# Patient Record
Sex: MALE | Race: WHITE | NOT HISPANIC OR LATINO | URBAN - METROPOLITAN AREA
[De-identification: names, ages, dates, MRNs, and addresses within clinical notes are randomized per-mention and may not be internally consistent; named-entity substitution may affect disease eponyms.]

---

## 2019-06-19 ENCOUNTER — OUTPATIENT (OUTPATIENT)
Dept: OUTPATIENT SERVICES | Facility: HOSPITAL | Age: 64
LOS: 1 days | End: 2019-06-19
Payer: COMMERCIAL

## 2019-06-19 ENCOUNTER — APPOINTMENT (OUTPATIENT)
Dept: UROLOGY | Facility: CLINIC | Age: 64
End: 2019-06-19
Payer: COMMERCIAL

## 2019-06-19 VITALS
SYSTOLIC BLOOD PRESSURE: 143 MMHG | HEART RATE: 66 BPM | RESPIRATION RATE: 17 BRPM | HEIGHT: 70 IN | DIASTOLIC BLOOD PRESSURE: 82 MMHG | TEMPERATURE: 98.1 F | WEIGHT: 210 LBS | BODY MASS INDEX: 30.06 KG/M2

## 2019-06-19 PROCEDURE — 99203 OFFICE O/P NEW LOW 30 MIN: CPT | Mod: 25

## 2019-06-19 PROCEDURE — 51701 INSERT BLADDER CATHETER: CPT

## 2019-06-19 NOTE — HISTORY OF PRESENT ILLNESS
[FreeTextEntry1] : 64 year old male w/ severe depression presents w/ BPH and obstructive voiding symptoms.\par He is on Amphetamine to stay awake at work.\par Hx of Urolift in Nov 2018 in addition to 100 units of Botox in January and then 200 units of Botox in April 2019.\par He c/o urgency, very weak stream, and incontinence. \par Nocturia x2-3\par Previously had UDS evaluation, but he does not remember what those results were.\par PVR today was 236 mL.\par We will teach him SIC today.\par UA and urine culture ordered\par PSA ordered today.\par RTO next week. Consider scheduling for UDS and/or cystoscopy at that stage.

## 2019-06-19 NOTE — REVIEW OF SYSTEMS
[Negative] : Heme/Lymph [Feeling Tired] : feeling tired [Palpitations] : palpitations [Abdominal Pain] : abdominal pain [Constipation] : constipation [Diarrhea] : diarrhea [Heartburn] : heartburn [see HPI] : see HPI [No erections] : no erections [Seen by urologist before (Name)  ___] : Preciously seen by a urologist: [unfilled] [Urine retention] : urine retention [Wake up at night to urinate  How many times?  ___] : wakes up to urinate [unfilled] times during the night [Bladder pressure] : experiences bladder pressure [Strain or push to urinate] : strain or push to urinate [Slow urine stream] : slow urine stream [Interrupted urine stream] : interrupted urine stream [Bladder fullness after urinating] : bladder fullness after urinating [Anxiety] : anxiety [Depression] : depression [Feelings Of Weakness] : feelings of weakness

## 2019-06-19 NOTE — ASSESSMENT
[FreeTextEntry1] : PVR today was 236 mL.\par We will teach him SIC today.\par UA and urine culture ordered\par PSA ordered today.\par RTO next week. Consider scheduling for UDS and/or cystoscopy at that stage.

## 2019-06-19 NOTE — ADDENDUM
[FreeTextEntry1] :  I, Shannan García, acted solely as a scribe for Dr. Emanuel Moise. The documentation recorded by the scribe accurately reflects the service I personally performed and the decision by me.\par

## 2019-06-21 LAB — BACTERIA UR CULT: NORMAL

## 2019-06-27 DIAGNOSIS — R33.9 RETENTION OF URINE, UNSPECIFIED: ICD-10-CM

## 2019-06-27 DIAGNOSIS — R35.0 FREQUENCY OF MICTURITION: ICD-10-CM

## 2019-06-27 DIAGNOSIS — N40.1 BENIGN PROSTATIC HYPERPLASIA WITH LOWER URINARY TRACT SYMPTOMS: ICD-10-CM

## 2019-07-05 ENCOUNTER — APPOINTMENT (OUTPATIENT)
Dept: UROLOGY | Facility: CLINIC | Age: 64
End: 2019-07-05
Payer: COMMERCIAL

## 2019-07-05 PROCEDURE — 99213 OFFICE O/P EST LOW 20 MIN: CPT

## 2019-07-05 NOTE — ASSESSMENT
[FreeTextEntry1] : He is on SIC about every 5 hours because he reports he does not have the urge. \par Advised pt to perform SIC routinely every 4 hours after he has finished voiding what he can into the toilet.\par Male urinal was given to pt to measure his residual urine.\par We will schedule him for UDS evaluation.

## 2019-07-05 NOTE — HISTORY OF PRESENT ILLNESS
[FreeTextEntry1] : 64 year old male on SIC presents for f/u for incontinence\par He is on Amphetamine to stay awake at work.\par Hx of Urolift in Nov 2018 in addition to 100 units of Botox in January and then 200 units of Botox in April 2019\par He still c/o some urge incontinence.\par He is on SIC about every 5 hours because he reports he does not have the urge. \par Advised pt to perform SIC routinely every 4 hours after he has finished voiding what he can into the toilet.\par Male urinal was given to pt to measure his residual urine.\par We will schedule him for UDS evaluation.

## 2019-07-17 ENCOUNTER — OUTPATIENT (OUTPATIENT)
Dept: OUTPATIENT SERVICES | Facility: HOSPITAL | Age: 64
LOS: 1 days | End: 2019-07-17
Payer: COMMERCIAL

## 2019-07-17 ENCOUNTER — APPOINTMENT (OUTPATIENT)
Dept: UROLOGY | Facility: CLINIC | Age: 64
End: 2019-07-17
Payer: COMMERCIAL

## 2019-07-17 VITALS — HEART RATE: 74 BPM | TEMPERATURE: 97.7 F | SYSTOLIC BLOOD PRESSURE: 133 MMHG | DIASTOLIC BLOOD PRESSURE: 87 MMHG

## 2019-07-17 DIAGNOSIS — R35.0 FREQUENCY OF MICTURITION: ICD-10-CM

## 2019-07-17 PROCEDURE — 51741 ELECTRO-UROFLOWMETRY FIRST: CPT | Mod: 26

## 2019-07-17 PROCEDURE — 51797 INTRAABDOMINAL PRESSURE TEST: CPT | Mod: 26

## 2019-07-17 PROCEDURE — 51784 ANAL/URINARY MUSCLE STUDY: CPT | Mod: 26

## 2019-07-17 PROCEDURE — 51741 ELECTRO-UROFLOWMETRY FIRST: CPT

## 2019-07-17 PROCEDURE — 51728 CYSTOMETROGRAM W/VP: CPT

## 2019-07-17 PROCEDURE — 51728 CYSTOMETROGRAM W/VP: CPT | Mod: 26

## 2019-07-17 PROCEDURE — 51797 INTRAABDOMINAL PRESSURE TEST: CPT

## 2019-07-17 PROCEDURE — 51784 ANAL/URINARY MUSCLE STUDY: CPT

## 2019-07-22 DIAGNOSIS — N40.1 BENIGN PROSTATIC HYPERPLASIA WITH LOWER URINARY TRACT SYMPTOMS: ICD-10-CM

## 2019-07-22 DIAGNOSIS — N32.81 OVERACTIVE BLADDER: ICD-10-CM

## 2019-07-22 DIAGNOSIS — N39.41 URGE INCONTINENCE: ICD-10-CM

## 2019-07-31 ENCOUNTER — APPOINTMENT (OUTPATIENT)
Dept: UROLOGY | Facility: CLINIC | Age: 64
End: 2019-07-31
Payer: COMMERCIAL

## 2019-07-31 ENCOUNTER — OUTPATIENT (OUTPATIENT)
Dept: OUTPATIENT SERVICES | Facility: HOSPITAL | Age: 64
LOS: 1 days | End: 2019-07-31
Payer: COMMERCIAL

## 2019-07-31 VITALS
SYSTOLIC BLOOD PRESSURE: 134 MMHG | TEMPERATURE: 98.5 F | RESPIRATION RATE: 16 BRPM | DIASTOLIC BLOOD PRESSURE: 83 MMHG | HEART RATE: 83 BPM

## 2019-07-31 DIAGNOSIS — R35.0 FREQUENCY OF MICTURITION: ICD-10-CM

## 2019-07-31 PROCEDURE — 52000 CYSTOURETHROSCOPY: CPT

## 2019-08-02 NOTE — ASSESSMENT
[FreeTextEntry1] : UDS evaluation today shows no evidence of any obstruction, but this pt has detrusor instability and severe urge incontinence. He can void with a peak flow up to 30 mL/s but he also has continuously high residual urine that varies between 50 and 275 mL.\par I am concerned that he may have a diverticulum that is the underlying problem and consequently, I would like to do a cystoscopy on him next week. \par Pt will perform self intermittent catheterization ever 4 hours, 14 [place] coude needed because of enlarged prostate.

## 2019-08-02 NOTE — HISTORY OF PRESENT ILLNESS
[FreeTextEntry1] : 64 year old male w/ severe depression on amphetamine presents for UDS today.\par Hx of Urolift in Nov 2018 in addition to 100 units of Botox in January and 200 units of Botox in April 2019.\par Still has complaints of urge incontinence.\par His leakage has improved since he started performing SIC routinely every 4 hours.\par UDS evaluation today shows no evidence of any obstruction, but this pt has detrusor instability and severe urge incontinence. He can void with a peak flow up to 30 mL/s but he also has continuously high residual urine that varies between 50 and 275 mL.\par I am concerned that he may have a diverticulum that is the underlying problem and consequently, I would like to do a cystoscopy on him next week.

## 2019-08-05 DIAGNOSIS — N32.81 OVERACTIVE BLADDER: ICD-10-CM

## 2019-11-13 ENCOUNTER — APPOINTMENT (OUTPATIENT)
Dept: UROLOGY | Facility: CLINIC | Age: 64
End: 2019-11-13
Payer: COMMERCIAL

## 2019-11-13 ENCOUNTER — OUTPATIENT (OUTPATIENT)
Dept: OUTPATIENT SERVICES | Facility: HOSPITAL | Age: 64
LOS: 1 days | End: 2019-11-13
Payer: COMMERCIAL

## 2019-11-13 VITALS
RESPIRATION RATE: 16 BRPM | BODY MASS INDEX: 30.06 KG/M2 | TEMPERATURE: 97.8 F | HEIGHT: 70 IN | WEIGHT: 210 LBS | HEART RATE: 80 BPM | SYSTOLIC BLOOD PRESSURE: 127 MMHG | DIASTOLIC BLOOD PRESSURE: 86 MMHG

## 2019-11-13 DIAGNOSIS — R35.0 FREQUENCY OF MICTURITION: ICD-10-CM

## 2019-11-13 PROCEDURE — 51728 CYSTOMETROGRAM W/VP: CPT | Mod: 26

## 2019-11-13 PROCEDURE — 51797 INTRAABDOMINAL PRESSURE TEST: CPT | Mod: 26

## 2019-11-13 PROCEDURE — 51741 ELECTRO-UROFLOWMETRY FIRST: CPT | Mod: 26

## 2019-11-13 PROCEDURE — 51798 US URINE CAPACITY MEASURE: CPT | Mod: 59

## 2019-11-13 PROCEDURE — 51797 INTRAABDOMINAL PRESSURE TEST: CPT

## 2019-11-13 PROCEDURE — 51784 ANAL/URINARY MUSCLE STUDY: CPT | Mod: 26

## 2019-11-13 PROCEDURE — 51728 CYSTOMETROGRAM W/VP: CPT

## 2019-11-13 PROCEDURE — 51784 ANAL/URINARY MUSCLE STUDY: CPT

## 2019-11-13 PROCEDURE — 51741 ELECTRO-UROFLOWMETRY FIRST: CPT

## 2019-11-13 NOTE — ADDENDUM
[FreeTextEntry1] :  I, Randi Cooper, acted solely as a scribe for Dr. Emanuel Moise. The documentation recorded by the scribe accurately reflects the service I personally performed and the decision by me.\par

## 2019-11-13 NOTE — HISTORY OF PRESENT ILLNESS
[FreeTextEntry1] : 64 year old male w/ severe depression on amphetamine presents for BPH\par Hx of Urolift in Nov 2018 in addition to 100 units of Botox in January and 200 units of Botox in April 2019.\par s/p cystoscopy on 07/31/19\par Pt was on self intermittent catheterization but developed a severe UTI that required hospitalization at [place]. He has stopped his injections since them\par Nocturia x1. Pt does not feel as if his urination has improved. He's still experiencing frequency and doesn't feel as if he's emptied his bladder. If he ignores the urge there is dribbling. \par Pt has been taking fluvoxamine for depression. He has been on Oxybutynin with no improvement. \par \par Urine culture from 06/19/19 revealed less than 10,000 normal Urogenital jaycob. \par Pt had UDS again today. He doesn't have an obstructive pattern at all. The detrusor pressure is 64 and he was having involuntary contractions. His PVR was 35mL and he had a peak flow of 7.6 m/s\par Pt has no obstructive features on urodynamic testing and therefore I can not recommend he has a transurethral resection of his prostate.\par I think this pt should have a consolation with Dr. An and see if he can come up with any alternate forms of therapy for him.

## 2019-11-13 NOTE — ASSESSMENT
[FreeTextEntry1] : Urine culture from 06/19/19 revealed less than 10,000 normal Urogenital jaycob. \par Pt had UDS again today. He doesn't have an obstructive pattern at all. The detrusor pressure is 64 and he was having involuntary contractions. His PVR was 35mL and he had a peak flow of 7.6 m/s\par Pt has no obstructive features on urodynamic testing and therefore I can not recommend he has a transurethral resection of his prostate.\par I think this pt should have a consolation with Dr. An and see if he can come up with any alternate forms of therapy for him.

## 2019-11-20 DIAGNOSIS — N31.8 OTHER NEUROMUSCULAR DYSFUNCTION OF BLADDER: ICD-10-CM

## 2019-11-25 ENCOUNTER — APPOINTMENT (OUTPATIENT)
Dept: UROLOGY | Facility: CLINIC | Age: 64
End: 2019-11-25

## 2020-01-02 ENCOUNTER — APPOINTMENT (OUTPATIENT)
Dept: UROLOGY | Facility: CLINIC | Age: 65
End: 2020-01-02
Payer: COMMERCIAL

## 2020-01-02 VITALS
HEART RATE: 105 BPM | TEMPERATURE: 97.6 F | DIASTOLIC BLOOD PRESSURE: 77 MMHG | WEIGHT: 210 LBS | BODY MASS INDEX: 30.06 KG/M2 | RESPIRATION RATE: 17 BRPM | HEIGHT: 70 IN | SYSTOLIC BLOOD PRESSURE: 150 MMHG

## 2020-01-02 DIAGNOSIS — R33.9 RETENTION OF URINE, UNSPECIFIED: ICD-10-CM

## 2020-01-02 PROCEDURE — 99214 OFFICE O/P EST MOD 30 MIN: CPT

## 2020-01-02 PROCEDURE — 51798 US URINE CAPACITY MEASURE: CPT

## 2020-01-02 RX ORDER — SULFAMETHOXAZOLE AND TRIMETHOPRIM 800; 160 MG/1; MG/1
800-160 TABLET ORAL
Qty: 28 | Refills: 0 | Status: COMPLETED | COMMUNITY
Start: 2019-12-18

## 2020-01-02 RX ORDER — BUPROPION HYDROCHLORIDE 300 MG/1
300 TABLET, EXTENDED RELEASE ORAL
Qty: 90 | Refills: 0 | Status: ACTIVE | COMMUNITY
Start: 2019-10-24

## 2020-01-02 RX ORDER — TRAZODONE HYDROCHLORIDE 100 MG/1
100 TABLET ORAL
Qty: 90 | Refills: 0 | Status: ACTIVE | COMMUNITY
Start: 2019-03-27

## 2020-01-02 RX ORDER — BUPROPION HYDROCHLORIDE 100 MG/1
100 TABLET, FILM COATED, EXTENDED RELEASE ORAL
Qty: 60 | Refills: 0 | Status: DISCONTINUED | COMMUNITY
Start: 2019-05-09 | End: 2020-01-02

## 2020-01-02 RX ORDER — DEXTROAMPHETAMINE SACCHARATE, AMPHETAMINE ASPARTATE MONOHYDRATE, DEXTROAMPHETAMINE SULFATE AND AMPHETAMINE SULFATE 2.5; 2.5; 2.5; 2.5 MG/1; MG/1; MG/1; MG/1
10 CAPSULE, EXTENDED RELEASE ORAL
Qty: 30 | Refills: 0 | Status: ACTIVE | COMMUNITY
Start: 2019-12-17

## 2020-01-02 RX ORDER — CLONAZEPAM 1 MG/1
1 TABLET ORAL
Qty: 90 | Refills: 0 | Status: ACTIVE | COMMUNITY
Start: 2019-12-17

## 2020-01-02 RX ORDER — FLUVOXAMINE MALEATE 100 MG/1
100 TABLET, FILM COATED ORAL
Qty: 270 | Refills: 0 | Status: ACTIVE | COMMUNITY
Start: 2019-05-09

## 2020-01-02 NOTE — HISTORY OF PRESENT ILLNESS
[FreeTextEntry1] : 64 year old gentleman with hx of BPH  referred by Dr Moise for refractory OAB- wet - UUI ,urinary urgency at times, frequency, small volume voids.Recent UTI treated with Bactrim for 14 days completed yesterday and no lab with pt. He is pending MRI of his LS fro hx of falls and is seeing Neurologist soon. PSH :Hx of Urolift in Nov 2018 ( [facility]) in addition to 100 units of Botox in January and 200 units of Botox in April 2019.He is s/p cystoscopy on 07/31/19-A flexible cystoscope was used to visualize the urethra and bladder. The anterior urethra was normal. The prostatic urethra had enlargement of the lateral prostatic lobes and mild bilobar hypertrophy. The bladder outlet was normal. No bladder tumor visualized. Mucosal abnormality was not present in the bladder wall. The bladder wall demonstrates a grade 4. diverticula were present in the bladder wall. \par Additional Procedural Findings/Comments: This pt has a very sacculated bladder and is on SIC. At present, he finds that he is averaging a residual urine of about 175 mL. I've advised him to have a repeat UDS evaluation in a few weeks time to see if any detrusor function has improved. In the interim, he has to remain on SIC. Pt was on self intermittent catheterization but developed a severe UTI that required hospitalization at [place]. He has stopped CIC since Dec 2019 after having a bad UTI. Prior to this he was doing CIC X 4-6 times per day - no volumes measured.Voids with LUTS- weak stream,intermittency, dribbling , small volume voids. \par  Pt has been taking fluvoxamine for depression.  \par  ml \par Options for management of the patient's overactive bladder and incontinence discussed at length. These included medical therapy, behavioral modification, bladder retraining, and surgical options. Surgical options for third line therapies reviewed included injection of botulinum toxin versus sacral nerve stimulation therapy. The patient has decided to move forward with sacral nerve stimulation. RBAPC of this procedure discussed at length. Risks including but not limited to infection, bleeding, pain, persistence of voiding dysfunction, nerve damage, lack of efficacy discussed with the patient at length. After above discussed and all questions answered the patient decided to move forward with this procedure. \par Urodynamic Interpretation : 11/13/2019\par Normal bladder sensation. Decreased bladder capacity. Patient experiencing detrusor instability. Pt has an appropriate EMG response to involuntary contractions:. The patient has severe  urge incontinence. The uroflow rate is decreased. The uroflow pattern is staccato. The detrusor contractility is normal. The intravesical voiding pressure is normal. The patient has incomplete bladder emptying, a post void residual of 35 cc. The spincter activity is normal synergic. \par \par

## 2020-01-02 NOTE — PHYSICAL EXAM
[General Appearance - Well Developed] : well developed [General Appearance - Well Nourished] : well nourished [Normal Appearance] : normal appearance [Well Groomed] : well groomed [Abdomen Soft] : soft [General Appearance - In No Acute Distress] : no acute distress [Abdomen Tenderness] : non-tender [Costovertebral Angle Tenderness] : no ~M costovertebral angle tenderness [Edema] : no peripheral edema [] : no respiratory distress [Respiration, Rhythm And Depth] : normal respiratory rhythm and effort [Exaggerated Use Of Accessory Muscles For Inspiration] : no accessory muscle use [Oriented To Time, Place, And Person] : oriented to person, place, and time [Affect] : the affect was normal [Mood] : the mood was normal [No Palpable Adenopathy] : no palpable adenopathy [FreeTextEntry1] : uses walker to ambulate - has mobility issues.

## 2020-01-02 NOTE — ASSESSMENT
[FreeTextEntry1] : 64 year old gentleman with hx of BPH  referred by Dr Moise for refractory OAB- wet - UUI ,urinary urgency at times, frequency, small volume voids. PSH :Hx of Urolift in Nov 2018 ( [facility]) in addition to 100 units of Botox in January and 200 units of Botox in April 2019.He is s/p cystoscopy on 07/31/19-A flexible cystoscope was used to visualize the urethra and bladder. The anterior urethra was normal. The prostatic urethra had enlargement of the lateral prostatic lobes and mild bilobar hypertrophy. The bladder outlet was normal. No bladder tumor visualized. Mucosal abnormality was not present in the bladder wall. The bladder wall demonstrates a grade 4. diverticula were present in the bladder wall. Additional Procedural Findings/Comments: This pt has a very sacculated bladder and is on SIC. At present, he finds that he is averaging a residual urine of about 175 mL. I've advised him to have a repeat UDS evaluation in a few weeks time to see if any detrusor function has improved. In the interim, he has to remain on SIC. Pt was on self intermittent catheterization but developed a severe UTI that required hospitalization at [place]. He has stopped CIC since Dec 2019 after having a bad UTI. Prior to this he was doing CIC X 4-6 times per day - no volumes measured.Voids with LUTS- weak stream,intermittency, dribbling , small volume voids.  Pt has been taking fluvoxamine for depression.   ml \par Urodynamic Interpretation : 11/13/2019\par Normal bladder sensation. Decreased bladder capacity. Patient experiencing detrusor instability. Pt has an appropriate EMG response to involuntary contractions:. The patient has severe  urge incontinence. The uroflow rate is decreased. The uroflow pattern is staccato. The detrusor contractility is normal. The intravesical voiding pressure is normal. The patient has incomplete bladder emptying, a post void residual of 35 cc. The spincter activity is normal synergic. \par \par 1.Options for management of the patient's overactive bladder and incontinence discussed at length. These included medical therapy, behavioral modification, bladder retraining, and surgical options. Surgical options for third line therapies reviewed included injection of botulinum toxin versus sacral nerve stimulation therapy. The patient has decided to move forward with sacral nerve stimulation. RBAPC of this procedure discussed at length. Risks including but not limited to infection, bleeding, pain, persistence of voiding dysfunction, nerve damage, lack of efficacy discussed with the patient at length. After above discussed and all questions answered the patient decided to move forward with this procedure.

## 2020-03-02 ENCOUNTER — OUTPATIENT (OUTPATIENT)
Dept: OUTPATIENT SERVICES | Facility: HOSPITAL | Age: 65
LOS: 1 days | End: 2020-03-02
Payer: COMMERCIAL

## 2020-03-02 VITALS
RESPIRATION RATE: 14 BRPM | HEIGHT: 68 IN | TEMPERATURE: 97 F | WEIGHT: 179.02 LBS | DIASTOLIC BLOOD PRESSURE: 80 MMHG | HEART RATE: 82 BPM | OXYGEN SATURATION: 96 % | SYSTOLIC BLOOD PRESSURE: 120 MMHG

## 2020-03-02 DIAGNOSIS — N32.81 OVERACTIVE BLADDER: ICD-10-CM

## 2020-03-02 DIAGNOSIS — Z92.29 PERSONAL HISTORY OF OTHER DRUG THERAPY: Chronic | ICD-10-CM

## 2020-03-02 DIAGNOSIS — Z98.890 OTHER SPECIFIED POSTPROCEDURAL STATES: Chronic | ICD-10-CM

## 2020-03-02 LAB
ANION GAP SERPL CALC-SCNC: 12 MMO/L — SIGNIFICANT CHANGE UP (ref 7–14)
BUN SERPL-MCNC: 13 MG/DL — SIGNIFICANT CHANGE UP (ref 7–23)
CALCIUM SERPL-MCNC: 9.2 MG/DL — SIGNIFICANT CHANGE UP (ref 8.4–10.5)
CHLORIDE SERPL-SCNC: 102 MMOL/L — SIGNIFICANT CHANGE UP (ref 98–107)
CO2 SERPL-SCNC: 27 MMOL/L — SIGNIFICANT CHANGE UP (ref 22–31)
CREAT SERPL-MCNC: 0.87 MG/DL — SIGNIFICANT CHANGE UP (ref 0.5–1.3)
GLUCOSE SERPL-MCNC: 105 MG/DL — HIGH (ref 70–99)
HCT VFR BLD CALC: 44.7 % — SIGNIFICANT CHANGE UP (ref 39–50)
HGB BLD-MCNC: 14.6 G/DL — SIGNIFICANT CHANGE UP (ref 13–17)
MCHC RBC-ENTMCNC: 30.3 PG — SIGNIFICANT CHANGE UP (ref 27–34)
MCHC RBC-ENTMCNC: 32.7 % — SIGNIFICANT CHANGE UP (ref 32–36)
MCV RBC AUTO: 92.7 FL — SIGNIFICANT CHANGE UP (ref 80–100)
NRBC # FLD: 0 K/UL — SIGNIFICANT CHANGE UP (ref 0–0)
PLATELET # BLD AUTO: 209 K/UL — SIGNIFICANT CHANGE UP (ref 150–400)
PMV BLD: 10.5 FL — SIGNIFICANT CHANGE UP (ref 7–13)
POTASSIUM SERPL-MCNC: 3.6 MMOL/L — SIGNIFICANT CHANGE UP (ref 3.5–5.3)
POTASSIUM SERPL-SCNC: 3.6 MMOL/L — SIGNIFICANT CHANGE UP (ref 3.5–5.3)
RBC # BLD: 4.82 M/UL — SIGNIFICANT CHANGE UP (ref 4.2–5.8)
RBC # FLD: 12.3 % — SIGNIFICANT CHANGE UP (ref 10.3–14.5)
SODIUM SERPL-SCNC: 141 MMOL/L — SIGNIFICANT CHANGE UP (ref 135–145)
WBC # BLD: 7.87 K/UL — SIGNIFICANT CHANGE UP (ref 3.8–10.5)
WBC # FLD AUTO: 7.87 K/UL — SIGNIFICANT CHANGE UP (ref 3.8–10.5)

## 2020-03-02 PROCEDURE — 93010 ELECTROCARDIOGRAM REPORT: CPT

## 2020-03-02 NOTE — H&P PST ADULT - OTHER CARE PROVIDERS
Dr Emanuel Moise (uro) Dr Dung Caceres [phone] (Psych) Dr Edmond (cardiologist) [phone] Dr Emanuel Moise (uro) Dr Karl Caceres [phone] (Psych) Dr Edmond (cardiologist) [phone]

## 2020-03-02 NOTE — H&P PST ADULT - HISTORY OF PRESENT ILLNESS
65 year old male with PMH of ADHD, depression, anxiety, BPH, presents with preop dx overactive bladder scheduled for stage 1 interstim on 3/10/2020. Patient reports long history of overactive bladder, history of botox injections and training, patient states no improvement in symptoms . 65 year old male with PMH of ADHD, depression, anxiety, BPH, presents with preop dx of overactive bladder scheduled for stage 1 interstim on 3/10/2020. Patient reports long history of overactive bladder/ incontinence, history of botox injections and training, with no improvement in symptoms .

## 2020-03-02 NOTE — H&P PST ADULT - RS GEN PE MLT RESP DETAILS PC
airway patent/respirations non-labored/good air movement/no rales/breath sounds equal/no rhonchi/no wheezes/clear to auscultation bilaterally

## 2020-03-02 NOTE — H&P PST ADULT - NEUROLOGICAL COMMENTS
diminished sensation in bilateral 5th digits, improved with therapy to cervical spine. Weakness in lower extremities due to parkinson's syndrome

## 2020-03-02 NOTE — H&P PST ADULT - ASSESSMENT
Problem:  Overactive bladder  Assessment and Plan: Patient scheduled for surgery on 3/10/2020  Patient provided with verbal and written presurgical instructions; verbalized understanding  with teach back.    Patient provided with famotidine for GI prophylaxis; verbalized understanding.    Patient provided with Chlorhexidine wash, verbal and written instructions reviewed. Patient demonstrated understanding with teach back.     OR booking notified of [facility],     Echo requested    Medical  evaluation requested by surgeon and PST for, patient verbalized understanding, will make appointment  Case discussed with    Patient instructed to stop aspirin on 3/3/2020 Problem:  Overactive bladder  Assessment and Plan: Patient scheduled for surgery on 3/10/2020  Patient provided with verbal and written presurgical instructions; verbalized understanding  with teach back.    Patient provided with famotidine for GI prophylaxis; verbalized understanding.    Patient provided with Chlorhexidine wash, verbal and written instructions reviewed. Patient demonstrated understanding with teach back.     OR booking notified of [facility],     Echo requested    Medical  evaluation requested by surgeon and PST for, patient verbalized understanding, will make appointment  Case discussed with Dr Low     Patient instructed to stop aspirin on 3/3/2020 Problem:  Overactive bladder  Assessment and Plan: Patient scheduled for surgery on 3/10/2020  Patient provided with verbal and written presurgical instructions; verbalized understanding  with teach back.    Patient provided with famotidine for GI prophylaxis; verbalized understanding.    Patient provided with Chlorhexidine wash, verbal and written instructions reviewed. Patient demonstrated understanding with teach back.     OR booking notified of [facility],     Echo requested    Psych evaluation requested by PST for suicidal ideation; Case discussed with Dr Low , patient to visit Dr Caceres today after speaking with him over the phone     Patient instructed to stop aspirin on 3/3/2020 Problem:  Overactive bladder  Assessment and Plan: Patient scheduled for surgery on 3/10/2020  Patient provided with verbal and written presurgical instructions; verbalized understanding  with teach back.    Patient provided with famotidine for GI prophylaxis; verbalized understanding.    Patient provided with Chlorhexidine wash, verbal and written instructions reviewed. Patient demonstrated understanding with teach back.     OR booking notified of [facility],     Echo requested    Psych evaluation requested by PST for suicidal ideation; Case discussed with Dr Low , patient to visit Dr Caceres today after speaking with him over the phone at 3 pm     Patient instructed to stop aspirin on 3/3/2020 Problem:  Overactive bladder  Assessment and Plan: Patient scheduled for surgery on 3/10/2020  Patient provided with verbal and written presurgical instructions; verbalized understanding  with teach back.    Patient provided with famotidine for GI prophylaxis; verbalized understanding.    Patient provided with Chlorhexidine wash, verbal and written instructions reviewed. Patient demonstrated understanding with teach back.     OR booking notified of [facility],     Echo requested    Psych evaluation requested by PST for suicidal ideation; Case discussed with Dr Low and Dr Caceres,  Dr Caceres will evaluate pt today.   Patient denies any suicidal thoughts today     Patient instructed to stop aspirin on 3/3/2020

## 2020-03-02 NOTE — H&P PST ADULT - GENITOURINARY COMMENTS
Dx overactive bladder- incontinence, using depends. PMH of urinary retention was self catheterizing until 11/2019 due to UTI, denies UTI since he stopped catheterizing; bladder scan at urologist noted average of 100 ml of urine in bladder .

## 2020-03-02 NOTE — H&P PST ADULT - PSYCHIATRIC COMMENTS
Patient expresses thoughts of wanting to hurt himself, such as jumping in front of train or jumping off a bridge, visit psychologist q 2 weeks, has informed psychologist of thoughts , has added medications ,"thoughts have  improved " Patient reports history of suicidal ideation, last occurrence one week ago, when asked if he had a plan he responded "jumping in front of train or jumping off a bridge" states thoughts arise when he is alone and bored, visit psychologist q 2 weeks, has informed psychologist of thoughts; added medications ,"thoughts have  improved, but still reoccur "

## 2020-03-02 NOTE — H&P PST ADULT - NSICDXPASTMEDICALHX_GEN_ALL_CORE_FT
PAST MEDICAL HISTORY:  Adult ADHD     Anxiety     Depression     Parkinson's syndrome PAST MEDICAL HISTORY:  Adult ADHD     Anxiety     Depression     Overactive bladder     Parkinson's syndrome

## 2020-03-09 ENCOUNTER — TRANSCRIPTION ENCOUNTER (OUTPATIENT)
Age: 65
End: 2020-03-09

## 2020-03-10 ENCOUNTER — OUTPATIENT (OUTPATIENT)
Dept: OUTPATIENT SERVICES | Facility: HOSPITAL | Age: 65
LOS: 1 days | Discharge: HOME | End: 2020-03-10
Payer: COMMERCIAL

## 2020-03-10 ENCOUNTER — APPOINTMENT (OUTPATIENT)
Dept: UROLOGY | Facility: AMBULATORY SURGERY CENTER | Age: 65
End: 2020-03-10

## 2020-03-10 VITALS
TEMPERATURE: 98 F | OXYGEN SATURATION: 96 % | SYSTOLIC BLOOD PRESSURE: 113 MMHG | DIASTOLIC BLOOD PRESSURE: 67 MMHG | RESPIRATION RATE: 14 BRPM | HEART RATE: 72 BPM

## 2020-03-10 VITALS
HEART RATE: 82 BPM | WEIGHT: 179.02 LBS | HEIGHT: 68 IN | SYSTOLIC BLOOD PRESSURE: 120 MMHG | RESPIRATION RATE: 14 BRPM | OXYGEN SATURATION: 96 % | TEMPERATURE: 97 F | DIASTOLIC BLOOD PRESSURE: 80 MMHG

## 2020-03-10 DIAGNOSIS — N32.81 OVERACTIVE BLADDER: ICD-10-CM

## 2020-03-10 DIAGNOSIS — Z92.29 PERSONAL HISTORY OF OTHER DRUG THERAPY: Chronic | ICD-10-CM

## 2020-03-10 DIAGNOSIS — Z98.890 OTHER SPECIFIED POSTPROCEDURAL STATES: Chronic | ICD-10-CM

## 2020-03-10 PROCEDURE — 64581 OPN IMPLTJ NEA SACRAL NERVE: CPT

## 2020-03-10 PROCEDURE — 76000 FLUOROSCOPY <1 HR PHYS/QHP: CPT | Mod: 26,59

## 2020-03-10 RX ORDER — CEPHALEXIN 500 MG
1 CAPSULE ORAL
Qty: 9 | Refills: 0
Start: 2020-03-10 | End: 2020-03-12

## 2020-03-10 NOTE — BRIEF OPERATIVE NOTE - NSICDXBRIEFPROCEDURE_GEN_ALL_CORE_FT
PROCEDURES:  Insertion, electrode lead, neurostimulator, sacral, open 10-Mar-2020 14:02:08  Adolfo Moise

## 2020-03-10 NOTE — ASU DISCHARGE PLAN (ADULT/PEDIATRIC) - ASU DC SPECIAL INSTRUCTIONSFT
You may take over the counter pain medicine as needed for pain. Percocet has been prescribed for pain unrelieved by these meds.     Please complete course of antibiotics as prescribed.     Please leave dressings in place until your next procedure. Do not submerge in water. Please only take frontal showers. Try to not get incisions wet.     You will see Dr. An again at your procedure scheduled for next week.

## 2020-03-10 NOTE — ASU DISCHARGE PLAN (ADULT/PEDIATRIC) - CARE PROVIDER_API CALL
Johnathan An)  Urology  [address] [place] [address], [place] [address]  Phone: [phone]  Fax: [phone]  Follow Up Time:

## 2020-03-10 NOTE — ASU DISCHARGE PLAN (ADULT/PEDIATRIC) - COMMENTS
Your followup procedure is scheduled for next week. You do not need to come to the office prior to this procedure.

## 2020-03-16 ENCOUNTER — TRANSCRIPTION ENCOUNTER (OUTPATIENT)
Age: 65
End: 2020-03-16

## 2020-03-17 ENCOUNTER — OUTPATIENT (OUTPATIENT)
Dept: OUTPATIENT SERVICES | Facility: HOSPITAL | Age: 65
LOS: 1 days | Discharge: HOME | End: 2020-03-17
Payer: COMMERCIAL

## 2020-03-17 ENCOUNTER — APPOINTMENT (OUTPATIENT)
Dept: UROLOGY | Facility: AMBULATORY SURGERY CENTER | Age: 65
End: 2020-03-17

## 2020-03-17 VITALS — TEMPERATURE: 98 F | HEART RATE: 60 BPM

## 2020-03-17 VITALS
HEART RATE: 65 BPM | SYSTOLIC BLOOD PRESSURE: 107 MMHG | OXYGEN SATURATION: 96 % | RESPIRATION RATE: 16 BRPM | TEMPERATURE: 96 F | HEIGHT: 68 IN | DIASTOLIC BLOOD PRESSURE: 68 MMHG | WEIGHT: 179.02 LBS

## 2020-03-17 DIAGNOSIS — Z98.890 OTHER SPECIFIED POSTPROCEDURAL STATES: Chronic | ICD-10-CM

## 2020-03-17 DIAGNOSIS — N32.81 OVERACTIVE BLADDER: ICD-10-CM

## 2020-03-17 DIAGNOSIS — Z92.29 PERSONAL HISTORY OF OTHER DRUG THERAPY: Chronic | ICD-10-CM

## 2020-03-17 PROBLEM — F90.9 ATTENTION-DEFICIT HYPERACTIVITY DISORDER, UNSPECIFIED TYPE: Chronic | Status: ACTIVE | Noted: 2020-03-02

## 2020-03-17 PROBLEM — F32.9 MAJOR DEPRESSIVE DISORDER, SINGLE EPISODE, UNSPECIFIED: Chronic | Status: ACTIVE | Noted: 2020-03-02

## 2020-03-17 PROBLEM — F41.9 ANXIETY DISORDER, UNSPECIFIED: Chronic | Status: ACTIVE | Noted: 2020-03-02

## 2020-03-17 PROBLEM — G20 PARKINSON'S DISEASE: Chronic | Status: ACTIVE | Noted: 2020-03-02

## 2020-03-17 PROCEDURE — 64590 INS/RPL PRPH SAC/GSTR NPG/R: CPT

## 2020-03-17 PROCEDURE — 95972 ALYS CPLX SP/PN NPGT W/PRGRM: CPT | Mod: 59

## 2020-03-17 RX ORDER — TRAZODONE HCL 50 MG
1 TABLET ORAL
Qty: 0 | Refills: 0 | DISCHARGE

## 2020-03-17 RX ORDER — FLUVOXAMINE MALEATE 25 MG/1
1 TABLET ORAL
Qty: 0 | Refills: 0 | DISCHARGE

## 2020-03-17 RX ORDER — DEXTROAMPHETAMINE SACCHARATE, AMPHETAMINE ASPARTATE, DEXTROAMPHETAMINE SULFATE AND AMPHETAMINE SULFATE 1.875; 1.875; 1.875; 1.875 MG/1; MG/1; MG/1; MG/1
1 TABLET ORAL
Qty: 0 | Refills: 0 | DISCHARGE

## 2020-03-17 RX ORDER — CLONAZEPAM 1 MG
1 TABLET ORAL
Qty: 0 | Refills: 0 | DISCHARGE

## 2020-03-17 RX ORDER — ASPIRIN/CALCIUM CARB/MAGNESIUM 324 MG
1 TABLET ORAL
Qty: 0 | Refills: 0 | DISCHARGE

## 2020-03-17 RX ORDER — BUPROPION HYDROCHLORIDE 150 MG/1
1 TABLET, EXTENDED RELEASE ORAL
Qty: 0 | Refills: 0 | DISCHARGE

## 2020-03-17 RX ORDER — CARBIDOPA AND LEVODOPA 25; 100 MG/1; MG/1
1 TABLET ORAL
Qty: 0 | Refills: 0 | DISCHARGE

## 2020-09-21 ENCOUNTER — OUTPATIENT (OUTPATIENT)
Dept: OUTPATIENT SERVICES | Facility: HOSPITAL | Age: 65
LOS: 1 days | End: 2020-09-21
Payer: COMMERCIAL

## 2020-09-21 ENCOUNTER — APPOINTMENT (OUTPATIENT)
Dept: UROLOGY | Facility: CLINIC | Age: 65
End: 2020-09-21
Payer: COMMERCIAL

## 2020-09-21 VITALS — TEMPERATURE: 97.2 F

## 2020-09-21 DIAGNOSIS — Z92.29 PERSONAL HISTORY OF OTHER DRUG THERAPY: Chronic | ICD-10-CM

## 2020-09-21 DIAGNOSIS — Z98.890 OTHER SPECIFIED POSTPROCEDURAL STATES: Chronic | ICD-10-CM

## 2020-09-21 PROCEDURE — 99213 OFFICE O/P EST LOW 20 MIN: CPT | Mod: 25

## 2020-09-21 PROCEDURE — 95972 ALYS CPLX SP/PN NPGT W/PRGRM: CPT

## 2020-09-21 RX ORDER — CEPHALEXIN 500 MG/1
500 CAPSULE ORAL 3 TIMES DAILY
Qty: 90 | Refills: 0 | Status: COMPLETED | COMMUNITY
Start: 2020-03-17 | End: 2020-09-21

## 2020-09-21 RX ORDER — ULTRASOUND COUPLING MEDIUM
GEL (GRAM) TOPICAL
Qty: 3 | Refills: 3 | Status: DISCONTINUED | OUTPATIENT
Start: 2019-06-19 | End: 2020-09-21

## 2020-09-21 NOTE — ASSESSMENT
[FreeTextEntry1] : 65 year old gentleman with hx of BPH ,refractory OAB- wet - UUI ,urinary urgency at times, frequency, small volume voids. He is s/p SNS from 3/2020- POA today  . He has hx of falls and ambulating today with quad cane . PSH :Hx of Uro lift in Nov 2018 ( [facility]) in addition to 100 units of Botox in January and 200 units of Botox in April 2019.He is s/p cystoscopy on 07/31/19- The prostatic urethra had enlargement of the lateral prostatic lobes and mild bilobar hypertrophy. The bladder outlet was normal. No bladder tumor visualized. Mucosal abnormality was not present in the bladder wall. The bladder wall demonstrates a grade 4. diverticula were present in the bladder wall. Pt was on self intermittent catheterization at that time .He has stopped CIC since Dec 2019 after having a bad UTI. Prior to this he was doing CIC X 4-6 times per day - no volumes measured.Voids with LUTS- weak stream,intermittency, dribbling , small volume voids.  Pt has been taking fluvoxamine for depression.  Urodynamic Interpretation : 11/13/2019- SEE results.PVR - 79  ML\par SNS site dressings still on brownish color , pt states he has been showering and did not try to remove same.SNS interrogated - CDI, current program # 6 at 2.4  - no sensation of stim felt. \par Interrogated SNS and trial of program # 5 at 2.1 felt in penile urethra.\par  Most of stim felt in scrotum,penis. No impedances noted .He has to manage his chronic constipation better - uses Dulcolax suppository and fleets enema. Recommend to follow up with GI. \par 1.RTO in 6   months . Call us if any issues with SNS. \par 2.Follow up with GI for chronic constipation.

## 2020-09-21 NOTE — HISTORY OF PRESENT ILLNESS
[FreeTextEntry1] : 65 year old gentleman with hx of BPH ,refractory OAB- wet - UUI ,urinary urgency at times, frequency, small volume voids. He is s/p SNS from 3/2020- POA today  . He has hx of falls and is seeing Neurologist soon. PSH :Hx of Uro lift in Nov 2018 ( [facility]) in addition to 100 units of Botox in January and 200 units of Botox in April 2019.He is s/p cystoscopy on 07/31/19- The prostatic urethra had enlargement of the lateral prostatic lobes and mild bilobar hypertrophy. The bladder outlet was normal. No bladder tumor visualized. Mucosal abnormality was not present in the bladder wall. The bladder wall demonstrates a grade 4. diverticula were present in the bladder wall. Pt was on self intermittent catheterization at that time .He has stopped CIC since Dec 2019 after having a bad UTI. Prior to this he was doing CIC X 4-6 times per day - no volumes measured.Voids with LUTS- weak stream,intermittency, dribbling , small volume voids.  Pt has been taking fluvoxamine for depression.  Urodynamic Interpretation : 11/13/2019- SEE results.PVR - 79  ML\par SNS site dressings still on brownish color , pt states he has been showering and did not try to remove same.SNS interrogated - CDI, current program # 6 at 2.4  - no sensation of stim felt. \par Interrogated SNS and trial of program # 5 at 2.1 felt in penile urethra.\par  Most of stim felt in scrotum,penis. No impedances noted .He has to manage his chronic constipation better - uses Dulcolax suppository and fleets enema. Recommend to follow up with GI. RTO in 6   months . Call us if any issues with SNS.

## 2020-09-21 NOTE — REVIEW OF SYSTEMS
[Incontinence] : incontinence [Nocturia] : nocturia [see HPI] : see HPI [Difficulty Walking] : difficulty walking [Negative] : Heme/Lymph

## 2020-09-23 LAB — BACTERIA UR CULT: NORMAL

## 2020-09-25 DIAGNOSIS — R31.29 OTHER MICROSCOPIC HEMATURIA: ICD-10-CM

## 2020-09-28 DIAGNOSIS — N39.41 URGE INCONTINENCE: ICD-10-CM

## 2020-09-28 DIAGNOSIS — R35.0 FREQUENCY OF MICTURITION: ICD-10-CM

## 2020-09-28 DIAGNOSIS — K59.09 OTHER CONSTIPATION: ICD-10-CM

## 2020-09-28 LAB
APPEARANCE: CLEAR
BACTERIA: NEGATIVE
BILIRUBIN URINE: NEGATIVE
BLOOD URINE: NEGATIVE
COLOR: NORMAL
GLUCOSE QUALITATIVE U: NEGATIVE
HYALINE CASTS: 3 /LPF
KETONES URINE: NEGATIVE
LEUKOCYTE ESTERASE URINE: NEGATIVE
MICROSCOPIC-UA: NORMAL
NITRITE URINE: NEGATIVE
PH URINE: 6.5
PROTEIN URINE: NEGATIVE
RED BLOOD CELLS URINE: 4 /HPF
SPECIFIC GRAVITY URINE: 1.02
SQUAMOUS EPITHELIAL CELLS: 1 /HPF
UROBILINOGEN URINE: NORMAL
WHITE BLOOD CELLS URINE: 1 /HPF

## 2020-10-12 ENCOUNTER — APPOINTMENT (OUTPATIENT)
Dept: UROLOGY | Facility: CLINIC | Age: 65
End: 2020-10-12
Payer: COMMERCIAL

## 2020-10-12 ENCOUNTER — OUTPATIENT (OUTPATIENT)
Dept: OUTPATIENT SERVICES | Facility: HOSPITAL | Age: 65
LOS: 1 days | End: 2020-10-12
Payer: COMMERCIAL

## 2020-10-12 ENCOUNTER — APPOINTMENT (OUTPATIENT)
Dept: CT IMAGING | Facility: IMAGING CENTER | Age: 65
End: 2020-10-12
Payer: COMMERCIAL

## 2020-10-12 ENCOUNTER — RESULT REVIEW (OUTPATIENT)
Age: 65
End: 2020-10-12

## 2020-10-12 VITALS
WEIGHT: 186 LBS | BODY MASS INDEX: 27.55 KG/M2 | SYSTOLIC BLOOD PRESSURE: 138 MMHG | HEART RATE: 94 BPM | DIASTOLIC BLOOD PRESSURE: 77 MMHG | HEIGHT: 69 IN

## 2020-10-12 VITALS — TEMPERATURE: 94.5 F

## 2020-10-12 DIAGNOSIS — R31.29 OTHER MICROSCOPIC HEMATURIA: ICD-10-CM

## 2020-10-12 DIAGNOSIS — Z98.890 OTHER SPECIFIED POSTPROCEDURAL STATES: Chronic | ICD-10-CM

## 2020-10-12 DIAGNOSIS — Z92.29 PERSONAL HISTORY OF OTHER DRUG THERAPY: Chronic | ICD-10-CM

## 2020-10-12 DIAGNOSIS — R35.0 FREQUENCY OF MICTURITION: ICD-10-CM

## 2020-10-12 PROCEDURE — 52000 CYSTOURETHROSCOPY: CPT

## 2020-10-12 PROCEDURE — 51798 US URINE CAPACITY MEASURE: CPT

## 2020-10-12 PROCEDURE — 74178 CT ABD&PLV WO CNTR FLWD CNTR: CPT

## 2020-10-12 PROCEDURE — 99214 OFFICE O/P EST MOD 30 MIN: CPT | Mod: 25

## 2020-10-12 PROCEDURE — 74178 CT ABD&PLV WO CNTR FLWD CNTR: CPT | Mod: 26

## 2020-10-12 RX ORDER — POLYETHYLENE GLYCOL 3350 17 G
POWDER IN PACKET (EA) ORAL DAILY
Qty: 180 | Refills: 3 | Status: ACTIVE | COMMUNITY
Start: 2020-10-12 | End: 1900-01-01

## 2020-10-12 NOTE — HISTORY OF PRESENT ILLNESS
[FreeTextEntry1] : 65M hx BPH s/p urolift 2018 ([facility]) and OAB wet s/p botox (200u April 2019) s/p SNM (feb 2020) presents with refractory urinary urgency incontinence and microscopic hematuria.  He wears pull ups that are very wet. His SNS device has trouble connecting sometimes.  \par \par Interstim interrogated today: \par Sensation penis, scrotum\par Was program 5, amplitutude 2.1, turned up settings without any sensation. \par Changed to program 1, amplitude 2.1\par \par He has weak stream and straining to empty. \par \par Chronic constipation. still having trouble. He drinks 3-4 bottles of water a day. No fruits. Only frozen vegetables. \par \par UDS (jan 2020) DO, decreased flow, PVR 35\par \par CT (2020) - prostate size 52g\par \par PVR today = 201 ml\par \par

## 2020-10-12 NOTE — PHYSICAL EXAM
[Normal Appearance] : normal appearance [Well Groomed] : well groomed [General Appearance - In No Acute Distress] : no acute distress [Abdomen Soft] : soft [Abdomen Tenderness] : non-tender [Costovertebral Angle Tenderness] : no ~M costovertebral angle tenderness [Urethral Meatus] : meatus normal [Urinary Bladder Findings] : the bladder was normal on palpation [Scrotum] : the scrotum was normal [Skin Color & Pigmentation] : normal skin color and pigmentation [Skin Lesions] : no skin lesions [Arterial Pulses Normal] : the pedal pulses were normal [] : no respiratory distress [Exaggerated Use Of Accessory Muscles For Inspiration] : no accessory muscle use [Chest Palpation] : palpation of the chest revealed no abnormalities [Affect] : the affect was normal [Mood] : the mood was normal [FreeTextEntry1] : Uses walker [Sensation] : the sensory exam was normal to light touch and pinprick [No Palpable Adenopathy] : no palpable adenopathy

## 2020-10-12 NOTE — ASSESSMENT
[FreeTextEntry1] : PLAN\par OAB\par Interrogated the SNM today\par \par BPH\par Cysto shows obstructive Bladder outlet, CT shows 52g prostate\par Start Tamsulosin 0.4 mg qhs - discussed risks, including falls\par Start Finasteride 5 mg daily  -discussed risks , including weight gain, low libido, breast tenderness/development, takes 3-6 months to work\par \par Constipation\par Increase water intake 2x, add 3 fruits daily (currently 0), add gummy fiber\par \par Incomplete bladder emptying\par Treat for BPH\par SNM \par Increase water intake\par \par RTC 3 mo - repeat PVR. \par

## 2020-10-15 DIAGNOSIS — N32.81 OVERACTIVE BLADDER: ICD-10-CM

## 2020-10-15 DIAGNOSIS — K59.09 OTHER CONSTIPATION: ICD-10-CM

## 2020-10-15 DIAGNOSIS — N40.1 BENIGN PROSTATIC HYPERPLASIA WITH LOWER URINARY TRACT SYMPTOMS: ICD-10-CM

## 2020-10-15 DIAGNOSIS — N39.41 URGE INCONTINENCE: ICD-10-CM

## 2020-10-26 LAB
APPEARANCE: CLEAR
BACTERIA UR CULT: NORMAL
BACTERIA: NEGATIVE
BILIRUBIN URINE: NEGATIVE
BLOOD URINE: NEGATIVE
COLOR: COLORLESS
GLUCOSE QUALITATIVE U: NEGATIVE
HYALINE CASTS: 0 /LPF
KETONES URINE: NEGATIVE
LEUKOCYTE ESTERASE URINE: NEGATIVE
MICROSCOPIC-UA: NORMAL
NITRITE URINE: NEGATIVE
PH URINE: 7
PROTEIN URINE: NEGATIVE
RED BLOOD CELLS URINE: 0 /HPF
SPECIFIC GRAVITY URINE: 1.01
SQUAMOUS EPITHELIAL CELLS: 0 /HPF
UROBILINOGEN URINE: NORMAL
WHITE BLOOD CELLS URINE: 0 /HPF

## 2020-11-02 RX ORDER — TAMSULOSIN HYDROCHLORIDE 0.4 MG/1
0.4 CAPSULE ORAL
Qty: 180 | Refills: 3 | Status: ACTIVE | COMMUNITY
Start: 2020-11-02 | End: 1900-01-01

## 2020-12-28 ENCOUNTER — TRANSCRIPTION ENCOUNTER (OUTPATIENT)
Age: 65
End: 2020-12-28

## 2021-01-14 ENCOUNTER — APPOINTMENT (OUTPATIENT)
Dept: UROLOGY | Facility: CLINIC | Age: 66
End: 2021-01-14
Payer: COMMERCIAL

## 2021-01-14 VITALS
WEIGHT: 184 LBS | RESPIRATION RATE: 17 BRPM | TEMPERATURE: 97.1 F | DIASTOLIC BLOOD PRESSURE: 58 MMHG | SYSTOLIC BLOOD PRESSURE: 110 MMHG | HEART RATE: 91 BPM | HEIGHT: 69 IN | BODY MASS INDEX: 27.25 KG/M2

## 2021-01-14 PROCEDURE — 99214 OFFICE O/P EST MOD 30 MIN: CPT | Mod: 25

## 2021-01-14 PROCEDURE — 99072 ADDL SUPL MATRL&STAF TM PHE: CPT

## 2021-01-14 PROCEDURE — 51798 US URINE CAPACITY MEASURE: CPT

## 2021-01-14 RX ORDER — TADALAFIL 10 MG/1
10 TABLET, FILM COATED ORAL
Qty: 6 | Refills: 11 | Status: ACTIVE | COMMUNITY
Start: 2021-01-14 | End: 1900-01-01

## 2021-01-14 NOTE — HISTORY OF PRESENT ILLNESS
[FreeTextEntry1] : 65 y.o M hx BPH s/p Urolift 2018 ([facility]) and OAB wet s/p Botox (200 u April 2019) s/p SNM (feb 2020) presents with refractory urinary urgency incontinence and microscopic hematuria. He wears pull ups that are very wet.Voids X 5-6 per day and nocturia X 1-2, with pull ups X 2 for UUI .He is on Flomax 0.8 mg and Finasteride 5  mg po daily. He reports ED and tried sildenafil 20  mg in the past. Trial of Cialis 10  mg PO PRN send . Refer to Dr Moore/Dr Connor  for ED.  PVR 47  ML. SNS interrogated and pt did not sense stim with programs # 1 and 2 , changed to # 3 at 1.6 felt in penile area . No impedances. \par RTO in 6  months.

## 2021-01-14 NOTE — PHYSICAL EXAM
[General Appearance - Well Developed] : well developed [General Appearance - Well Nourished] : well nourished [Normal Appearance] : normal appearance [Well Groomed] : well groomed [General Appearance - In No Acute Distress] : no acute distress [Abdomen Soft] : soft [Abdomen Tenderness] : non-tender [Costovertebral Angle Tenderness] : no ~M costovertebral angle tenderness [Testes Mass (___cm)] : there were no testicular masses [No Prostate Nodules] : no prostate nodules [Edema] : no peripheral edema [] : no respiratory distress [Respiration, Rhythm And Depth] : normal respiratory rhythm and effort [Exaggerated Use Of Accessory Muscles For Inspiration] : no accessory muscle use [Oriented To Time, Place, And Person] : oriented to person, place, and time [Affect] : the affect was normal [Mood] : the mood was normal [Not Anxious] : not anxious [FreeTextEntry1] : quad cane for ambulation

## 2021-01-14 NOTE — ASSESSMENT
[FreeTextEntry1] : 65 y.o M hx BPH s/p Urolift 2018 ([facility]) and OAB wet s/p Botox (200 u April 2019) s/p SNM (feb 2020) presents with refractory urinary urgency incontinence and microscopic hematuria. He wears pull ups that are very wet.Voids X 5-6 per day and nocturia X 1-2, with pull ups X 2 for UUI .He is on Flomax 0.8 mg He is on Flomax 0.8 mg and Finasteride 5  mg po daily. He reports ED and tried sildenafil 20  mg in the past. Trial of Cialis 10  mg PO PRN send . Refer to Dr Moore/Dr Connor  for ED.  PVR 47  ML. SNS interrogated and pt did not sense stim with programs # 1 and 2 , changed to # 3 at 1.6 felt in penile area . No impedances. \par 1.Refer to Dr Moore /Dr Connor pending availability  for ED. \par 2.Continue Flomax 0.8 mg po daily. \par 3.Continue Finasteride 5  mg po daily\par 4.RTO in 6  months.

## 2021-02-05 ENCOUNTER — APPOINTMENT (OUTPATIENT)
Dept: UROLOGY | Facility: CLINIC | Age: 66
End: 2021-02-05
Payer: COMMERCIAL

## 2021-02-05 DIAGNOSIS — N50.0 ATROPHY OF TESTIS: ICD-10-CM

## 2021-02-05 PROCEDURE — 99072 ADDL SUPL MATRL&STAF TM PHE: CPT

## 2021-02-05 PROCEDURE — 99215 OFFICE O/P EST HI 40 MIN: CPT

## 2021-02-05 RX ORDER — PAPAVER/PHENTOLAMINE/ALPROSTAD 150-5-50
150-5-50 VIAL (EA) INTRACAVERNOSAL
Qty: 1 | Refills: 6 | Status: ACTIVE | COMMUNITY
Start: 2021-02-05 | End: 1900-01-01

## 2021-02-05 RX ORDER — TADALAFIL 5 MG/1
5 TABLET ORAL
Qty: 30 | Refills: 6 | Status: ACTIVE | COMMUNITY
Start: 2021-02-05 | End: 1900-01-01

## 2021-02-05 NOTE — PHYSICAL EXAM
[General Appearance - Well Developed] : well developed [General Appearance - Well Nourished] : well nourished [Normal Appearance] : normal appearance [Well Groomed] : well groomed [General Appearance - In No Acute Distress] : no acute distress [Abdomen Soft] : soft [Abdomen Tenderness] : non-tender [Costovertebral Angle Tenderness] : no ~M costovertebral angle tenderness [Edema] : no peripheral edema [] : no respiratory distress [Respiration, Rhythm And Depth] : normal respiratory rhythm and effort [Exaggerated Use Of Accessory Muscles For Inspiration] : no accessory muscle use [Oriented To Time, Place, And Person] : oriented to person, place, and time [Normal Station and Gait] : the gait and station were normal for the patient's age [No Focal Deficits] : no focal deficits [No Palpable Adenopathy] : no palpable adenopathy [FreeTextEntry1] : depressed

## 2021-02-05 NOTE — HISTORY OF PRESENT ILLNESS
[Currently Experiencing ___] :  [unfilled] [Erectile Dysfunction] : Erectile Dysfunction [FreeTextEntry1] : KANDACE PAYTON, a 66 year old male, presented to the office for a follow up regarding his erectile dysfunction.\par \par Takes many antidepressants\par \par Noticed 2 years ago\par \par No morning erections.\par \par Cannot masturbate; cannot ejaculate. Last time ejaculated 1 year ago - and ejaculate was discolored - looked brown.\par \par Last intercourse 2.5 years ago.\par \par Sees Dr. Shell for bladder issues: inserted stim in bladder \par

## 2021-02-05 NOTE — ASSESSMENT
[FreeTextEntry1] : ED \par Failed oral Tadalafil 10mg. PO and Viagra 100mg PO \par \par Has only taken one Cialis - just feels like nothing going to happen. \par \par Labs to check Testosterone\par \par Not adverse to trying TriMix \par \par Order TriMix bring back for DUS and injection teaching\par \par \par \par Following was discussed with the patient: \par Erectile dysfunction (ED) erectile dysfunction can be multifactorial in nature. Psychogenic erectile dysfunction from stress, anxiety, sexual inexperience, fear of being used, fear of pregnancy, fear of STDs, or due to mental disease is more common in younger men, but can be present in men of all ages. It typically responds to combination of medical management and behavioral therapy. Interestingly, patients with ED after prostatectomy may suffer from component of psychogenic ED due to incontinence and sense of embarrassment,  body image disorder when they feel altered, inadequate due to lack of seminal fluid expulsion. Men with urinary incontinence will withdraw from sexual activities because of shame and fear of urinating inside of their partners.  I reviewed change in behavior like emptying the bladder prior to sexual activities, using condom and penile/scrotal ring to minimize urine leakage. \par \par The vasculogenic ED specifically arteriogenic ED is more prevalent in patients with coronary artery disease, obesity, poorly controlled hypertension, diabetes mellitus, and hypercholesterolemia.  Importance of medical management of risk factors for arteriogenic ED was discussed. Venous leak is seen after prostatectomy and radiation therapy. Onset of radiation therapy related ED is typically delayed but venous leak is most common. Presence of penile scarring (Peyronie's disease) increases risk of venous leak. \par \par Evaluation of ED with physical exam, hormonal evaluation, and penile Doppler ultrasound (DUS) was discussed. \par \par Sexual rehabilitation therapy after prostatectomy was discussed with patient. Literature indicates that recovery of erectile function after prostatectomy may take 2 to 3 years and little improvements are noticed after that period however most data refer to patients being actively treated for their ED during this period. \par \par In patients with delayed initiation of treatment because incontinence or other factors - recovery of function may still significantly improve as most of them have not had systemic therapy for longer time. \par \par Step wise approach with escalating oral therapy and option of penile intracavernosal injection therapy was discussed. \par \par Risks, side effects, benefits, appropriate use in relation to food intake, need to stimulate/be aroused to measure response, and cost of therapy with oral medications was discussed. \par \par Risks and benefits of intracavernosal injection therapy were discussed. \par \par Patient will start on regimented oral 5PDE therapy and see me in couple of weeks. \par \par Total time of visit 40 min,  greater than 50% of that time was spent in counseling patient about the problems listed in plan and coordinating of care. Specifically causes, evaluation, treatment options, risks, complications, and benefits of  available therapies were discussed. Questions were answered. \par \par The submitted E/M billing level for this visit reflects the total time spent on the day of the visit including face-to-face time spent with the patient, non-face-to-face review of medical records and relevant information, documentation, and asynchronous communication with the patient after a visit via phone, email, or patient’s EHR portal after the visit.  \par The medical records reviewed are either scanned into the chart or reviewed with the patient using a patient’s electronic medical records portal for patients with records not available to [facility] via electronic transmission platforms from other institutions and labs. \par Time spend counseling and performing coordination of care was also included in determining the appropriate EM billing level.\par \par I have reviewed and verified information regarding the chief complaint and history recorded by the ancillary staff and/or the patient. I have independently reviewed and interpreted tests performed by other physicians and facilities as necessary. \par \par I have discussed with the patient differential diagnosis, reason for auxiliary tests if ordered, risks, benefits, alternatives, and complications of each form of therapy were discussed.

## 2021-02-13 LAB
TESTOST BND SERPL-MCNC: 11.8 PG/ML
TESTOST SERPL-MCNC: 689.6 NG/DL

## 2021-03-11 ENCOUNTER — APPOINTMENT (OUTPATIENT)
Dept: UROLOGY | Facility: CLINIC | Age: 66
End: 2021-03-11

## 2021-03-12 ENCOUNTER — APPOINTMENT (OUTPATIENT)
Dept: UROLOGY | Facility: CLINIC | Age: 66
End: 2021-03-12

## 2021-03-29 ENCOUNTER — APPOINTMENT (OUTPATIENT)
Dept: UROLOGY | Facility: CLINIC | Age: 66
End: 2021-03-29

## 2021-03-29 VITALS
RESPIRATION RATE: 16 BRPM | TEMPERATURE: 97.8 F | HEART RATE: 90 BPM | OXYGEN SATURATION: 93 % | DIASTOLIC BLOOD PRESSURE: 76 MMHG | SYSTOLIC BLOOD PRESSURE: 147 MMHG

## 2021-03-30 ENCOUNTER — APPOINTMENT (OUTPATIENT)
Dept: UROLOGY | Facility: CLINIC | Age: 66
End: 2021-03-30
Payer: COMMERCIAL

## 2021-03-30 VITALS
DIASTOLIC BLOOD PRESSURE: 85 MMHG | RESPIRATION RATE: 16 BRPM | OXYGEN SATURATION: 97 % | SYSTOLIC BLOOD PRESSURE: 131 MMHG | TEMPERATURE: 97.1 F | HEART RATE: 79 BPM

## 2021-03-30 DIAGNOSIS — R68.82 DECREASED LIBIDO: ICD-10-CM

## 2021-03-30 DIAGNOSIS — N52.9 MALE ERECTILE DYSFUNCTION, UNSPECIFIED: ICD-10-CM

## 2021-03-30 PROCEDURE — 93975 VASCULAR STUDY: CPT

## 2021-03-30 PROCEDURE — 99072 ADDL SUPL MATRL&STAF TM PHE: CPT

## 2021-03-30 PROCEDURE — 93980 PENILE VASCULAR STUDY: CPT

## 2021-06-22 ENCOUNTER — TRANSCRIPTION ENCOUNTER (OUTPATIENT)
Age: 66
End: 2021-06-22

## 2021-06-23 ENCOUNTER — OUTPATIENT (OUTPATIENT)
Dept: OUTPATIENT SERVICES | Facility: HOSPITAL | Age: 66
LOS: 1 days | End: 2021-06-23
Payer: COMMERCIAL

## 2021-06-23 DIAGNOSIS — R19.4 CHANGE IN BOWEL HABIT: ICD-10-CM

## 2021-06-23 DIAGNOSIS — Z92.29 PERSONAL HISTORY OF OTHER DRUG THERAPY: Chronic | ICD-10-CM

## 2021-06-23 DIAGNOSIS — Z98.890 OTHER SPECIFIED POSTPROCEDURAL STATES: Chronic | ICD-10-CM

## 2021-06-23 PROCEDURE — 45378 DIAGNOSTIC COLONOSCOPY: CPT

## 2021-07-08 ENCOUNTER — APPOINTMENT (OUTPATIENT)
Dept: UROLOGY | Facility: CLINIC | Age: 66
End: 2021-07-08
Payer: COMMERCIAL

## 2021-07-08 VITALS
HEART RATE: 67 BPM | SYSTOLIC BLOOD PRESSURE: 115 MMHG | RESPIRATION RATE: 16 BRPM | DIASTOLIC BLOOD PRESSURE: 74 MMHG | TEMPERATURE: 97.5 F

## 2021-07-08 DIAGNOSIS — K59.09 OTHER CONSTIPATION: ICD-10-CM

## 2021-07-08 DIAGNOSIS — N40.1 BENIGN PROSTATIC HYPERPLASIA WITH LOWER URINARY TRACT SYMPMS: ICD-10-CM

## 2021-07-08 DIAGNOSIS — N32.81 OVERACTIVE BLADDER: ICD-10-CM

## 2021-07-08 DIAGNOSIS — N13.8 BENIGN PROSTATIC HYPERPLASIA WITH LOWER URINARY TRACT SYMPMS: ICD-10-CM

## 2021-07-08 DIAGNOSIS — N39.41 URGE INCONTINENCE: ICD-10-CM

## 2021-07-08 PROCEDURE — 99072 ADDL SUPL MATRL&STAF TM PHE: CPT

## 2021-07-08 PROCEDURE — 99214 OFFICE O/P EST MOD 30 MIN: CPT

## 2021-07-08 RX ORDER — VIBEGRON 75 MG/1
75 TABLET, FILM COATED ORAL
Qty: 90 | Refills: 3 | Status: ACTIVE | COMMUNITY
Start: 2021-07-08 | End: 1900-01-01

## 2021-07-08 NOTE — PHYSICAL EXAM
[General Appearance - Well Developed] : well developed [Normal Appearance] : normal appearance [Abdomen Soft] : soft [Skin Color & Pigmentation] : normal skin color and pigmentation [Skin Turgor] : supple [] : no respiratory distress [Affect] : the affect was normal [Not Anxious] : not anxious

## 2021-07-08 NOTE — ASSESSMENT
Note completed but not signed.  Will wait for note to be signed before sending.    [FreeTextEntry1] : Counseled the patient on constipation and how it can affect the urinary tract. We discussed increasing water intake, daily fruits and vegetables, and sources of fiber.  We recommended 4 servings of whole fruit per day, excluding dried fruit or juices.  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  He will return to the office in 2 months for repeat post void residual and evaluation of symptoms\par \par He brought in his Trimix medication today and I advised him to make an appointment with

## 2021-07-08 NOTE — HISTORY OF PRESENT ILLNESS
[FreeTextEntry1] : 65-year-old male with history of BPH status post 2008 [facility]roLift [facility] with [facility], erectile dysfunction seen by Dr. Moore refractory to medications with plan to receive Trimix, and overactive bladder status post InterStim placed March 2020 by Dr. An, and Botox 200 units in April 2019.\par \par Patient is also on Flomax 0.8 mg and finasteride 5 mg which she stopped on his own.  Patient reports he is still voiding frequently multiple times per hour as well as having urgency incontinence episodes 1-2 times a day.\par \par I performed cystoscopy on the patient in October 2020 which demonstrated trilobar obstruction and grade 2 trabeculation.\par \par He had urodynamic testing in January 2020 that demonstrated detrusor overactivity decreased flow and a post void residual of 35.  A CAT scan in 2020 demonstrated a prostate size of 52 g.\par \par He still reports constipation despite eating 3 gummy fibers a day and what he says is adequate fruits and vegetables.  He also takes MiraLAX.\par \par \par Post void residual in October 2020 was 201 mL\par \par Postvoid residual in January 2021 was 47 mL

## 2021-09-20 ENCOUNTER — APPOINTMENT (OUTPATIENT)
Dept: UROLOGY | Facility: CLINIC | Age: 66
End: 2021-09-20

## 2021-10-04 ENCOUNTER — APPOINTMENT (OUTPATIENT)
Dept: UROLOGY | Facility: CLINIC | Age: 66
End: 2021-10-04

## 2021-10-21 ENCOUNTER — RX RENEWAL (OUTPATIENT)
Age: 66
End: 2021-10-21

## 2021-10-21 RX ORDER — TAMSULOSIN HYDROCHLORIDE 0.4 MG/1
0.4 CAPSULE ORAL
Qty: 90 | Refills: 3 | Status: ACTIVE | COMMUNITY
Start: 2020-10-12 | End: 1900-01-01

## 2021-10-21 RX ORDER — FINASTERIDE 5 MG/1
5 TABLET, FILM COATED ORAL
Qty: 90 | Refills: 3 | Status: ACTIVE | COMMUNITY
Start: 2020-10-12 | End: 1900-01-01

## 2022-08-15 LAB
ESTRADIOL SERPL-MCNC: 6 PG/ML
FSH SERPL-MCNC: 33 IU/L
LH SERPL-ACNC: 9.8 IU/L
PROLACTIN SERPL-MCNC: 9.4 NG/ML
TSH SERPL-ACNC: 2.66 UIU/ML

## 2024-09-10 ENCOUNTER — NON-APPOINTMENT (OUTPATIENT)
Age: 69
End: 2024-09-10

## 2024-09-10 ENCOUNTER — APPOINTMENT (OUTPATIENT)
Dept: OPHTHALMOLOGY | Facility: CLINIC | Age: 69
End: 2024-09-10
Payer: MEDICARE

## 2024-09-10 PROCEDURE — 92133 CPTRZD OPH DX IMG PST SGM ON: CPT

## 2024-09-10 PROCEDURE — 92083 EXTENDED VISUAL FIELD XM: CPT

## 2024-09-10 PROCEDURE — 99204 OFFICE O/P NEW MOD 45 MIN: CPT

## 2025-06-18 NOTE — H&P PST ADULT - HEMATOLOGY/LYMPHATICS
Blood pressure controlled continue carvedilol 12.5 mg twice a day with hydrochlorothiazide 25 mg 1 tablet daily reevaluate blood work next visit refilled medication  Orders:    carvedilol (Coreg) 12.5 mg tablet; Take 1 tablet (12.5 mg) by mouth 2 times a day.    hydroCHLOROthiazide (HYDRODiuril) 25 mg tablet; Take 1 tablet (25 mg) by mouth once daily.     negative